# Patient Record
Sex: MALE | Race: WHITE | HISPANIC OR LATINO | Employment: UNEMPLOYED
[De-identification: names, ages, dates, MRNs, and addresses within clinical notes are randomized per-mention and may not be internally consistent; named-entity substitution may affect disease eponyms.]

---

## 2021-01-01 ENCOUNTER — TELEMEDICINE (OUTPATIENT)
Dept: TELEHEALTH | Facility: TELEMEDICINE | Age: 26
End: 2021-01-01
Payer: OTHER GOVERNMENT

## 2021-01-01 DIAGNOSIS — U07.1 COVID-19: ICD-10-CM

## 2021-01-01 DIAGNOSIS — R11.0 NAUSEA: ICD-10-CM

## 2021-01-01 DIAGNOSIS — R05.9 COUGH: ICD-10-CM

## 2021-01-01 PROCEDURE — 99203 OFFICE O/P NEW LOW 30 MIN: CPT | Mod: 95,CR,CS | Performed by: NURSE PRACTITIONER

## 2021-01-01 RX ORDER — ONDANSETRON 4 MG/1
4 TABLET, FILM COATED ORAL EVERY 6 HOURS PRN
Qty: 12 TAB | Refills: 0 | Status: SHIPPED | OUTPATIENT
Start: 2021-01-01 | End: 2021-01-04

## 2021-01-01 ASSESSMENT — ENCOUNTER SYMPTOMS
SHORTNESS OF BREATH: 0
FEVER: 0
NAUSEA: 1
VOMITING: 1
SPUTUM PRODUCTION: 0
MYALGIAS: 0
CHILLS: 0
COUGH: 1
SORE THROAT: 0

## 2021-01-01 NOTE — PROGRESS NOTES
Patient counseled on quarantine/isolation protocol until covid tests are back. Patient has verbalized understanding of these instructions..  This encounter was completed via secure and encrypted videoconferencing equipment, the patient gave consent and patient's identification was confirmed.     CC: cough from covid, requesting cough medication    HPI: New. 25 year old male with cough from covid 19 and nausea, requesting phenergan with codeine cough medication for this. Denies fever, chills, myalgia, diarrhea or fever/chills. Diagnosed with covid mid December in [place].    Patient has no allergy information on record.  No current outpatient medications on file prior to visit.     No current facility-administered medications on file prior to visit.      Social History     Socioeconomic History   • Marital status: Single     Spouse name: Not on file   • Number of children: Not on file   • Years of education: Not on file   • Highest education level: Not on file   Occupational History   • Not on file   Social Needs   • Financial resource strain: Not on file   • Food insecurity     Worry: Not on file     Inability: Not on file   • Transportation needs     Medical: Not on file     Non-medical: Not on file   Tobacco Use   • Smoking status: Not on file   Substance and Sexual Activity   • Alcohol use: Not on file   • Drug use: Not on file   • Sexual activity: Not on file   Lifestyle   • Physical activity     Days per week: Not on file     Minutes per session: Not on file   • Stress: Not on file   Relationships   • Social connections     Talks on phone: Not on file     Gets together: Not on file     Attends Zoroastrianism service: Not on file     Active member of club or organization: Not on file     Attends meetings of clubs or organizations: Not on file     Relationship status: Not on file   • Intimate partner violence     Fear of current or ex partner: Not on file     Emotionally abused: Not on file     Physically abused:  Not on file     Forced sexual activity: Not on file   Other Topics Concern   • Not on file   Social History Narrative   • Not on file     Breast Cancer-related family history is not on file.      Review of Systems   Constitutional: Negative for chills and fever.   HENT: Negative for congestion and sore throat.    Respiratory: Positive for cough. Negative for sputum production and shortness of breath.    Gastrointestinal: Positive for nausea and vomiting.   Musculoskeletal: Negative for myalgias.       Physical Exam   Vital signs noted and nursing note reviewed  Constitutional: Patient is oriented to person, place, and time. Patient appears well-developed and well-nourished. No distress.   HENT:   Head: Normocephalic and atraumatic.   Right Ear: External ear normal.   Left Ear: External ear normal.   Nose: Nose normal. No visible drainage or bleeding  Eyes: Conjunctivae normal and EOM are normal. Pupils are equal  Neck: Normal range of motion. Neck supple.   Musculoskeletal: Normal range of motion.   Neurological:Patient is alert and oriented and cooperative. No gross deficit.  Skin: No rash noted.   Psychiatric: Patient has a normal mood and affect,  behavior is normal.     1. COVID-19     2. Cough     3. Nausea       Sent rx for zofran to pharmacy.  I declined filling for narcotic cough medication as his residence was listed as [place] and he has narcan and xanax on med sheet from previous provider. He has give updated information on moving to [place] several weeks ago at the height of his infection.